# Patient Record
Sex: FEMALE | Race: WHITE | NOT HISPANIC OR LATINO | URBAN - METROPOLITAN AREA
[De-identification: names, ages, dates, MRNs, and addresses within clinical notes are randomized per-mention and may not be internally consistent; named-entity substitution may affect disease eponyms.]

---

## 2024-03-02 ENCOUNTER — APPOINTMENT (OUTPATIENT)
Dept: PEDIATRICS | Facility: CLINIC | Age: 19
End: 2024-03-02
Payer: COMMERCIAL

## 2024-03-06 ENCOUNTER — OFFICE VISIT (OUTPATIENT)
Dept: PEDIATRICS | Facility: CLINIC | Age: 19
End: 2024-03-06
Payer: COMMERCIAL

## 2024-03-06 VITALS
HEIGHT: 66 IN | SYSTOLIC BLOOD PRESSURE: 118 MMHG | DIASTOLIC BLOOD PRESSURE: 76 MMHG | BODY MASS INDEX: 30.67 KG/M2 | WEIGHT: 190.8 LBS | HEART RATE: 72 BPM

## 2024-03-06 DIAGNOSIS — R42 DIZZINESS: ICD-10-CM

## 2024-03-06 DIAGNOSIS — Z00.00 WELLNESS EXAMINATION: Primary | ICD-10-CM

## 2024-03-06 PROBLEM — L70.9 ACNE: Status: ACTIVE | Noted: 2024-03-06

## 2024-03-06 PROBLEM — N92.6 IRREGULAR MENSTRUAL CYCLE: Status: ACTIVE | Noted: 2024-03-06

## 2024-03-06 LAB
NON-UH HIE A/G RATIO: 1.2
NON-UH HIE ALB: 4 G/DL (ref 3.4–5)
NON-UH HIE ALK PHOS: 62 UNIT/L (ref 45–117)
NON-UH HIE BILIRUBIN, TOTAL: 0.4 MG/DL (ref 0.3–1.2)
NON-UH HIE BUN/CREAT RATIO: 20
NON-UH HIE BUN: 16 MG/DL (ref 9–23)
NON-UH HIE CALCIUM: 9.5 MG/DL (ref 8.7–10.4)
NON-UH HIE CALCULATED LDL CHOLESTEROL: 76 MG/DL (ref 60–130)
NON-UH HIE CALCULATED OSMOLALITY: 284 MOSM/KG (ref 275–295)
NON-UH HIE CHLORIDE: 108 MMOL/L (ref 98–107)
NON-UH HIE CHOLESTEROL: 141 MG/DL (ref 100–200)
NON-UH HIE CO2, VENOUS: 26 MMOL/L (ref 20–31)
NON-UH HIE CREATININE: 0.8 MG/DL (ref 0.5–0.8)
NON-UH HIE FERRITIN: 32 NG/ML (ref 10–291)
NON-UH HIE GFR AA: >60
NON-UH HIE GLOBULIN: 3.4 G/DL
NON-UH HIE GLOMERULAR FILTRATION RATE: >60 ML/MIN/1.73M?
NON-UH HIE GLUCOSE: 90 MG/DL (ref 74–106)
NON-UH HIE GOT: 14 UNIT/L (ref 15–37)
NON-UH HIE GPT: 19 UNIT/L (ref 10–49)
NON-UH HIE HCT: 40.3 % (ref 36–46)
NON-UH HIE HDL CHOLESTEROL: 48 MG/DL (ref 40–60)
NON-UH HIE HGB: 13.6 G/DL (ref 12–16)
NON-UH HIE INSTR WBC ND: 5.6
NON-UH HIE IRON: 81 UG/DL (ref 50–170)
NON-UH HIE K: 4.4 MMOL/L (ref 3.5–5.1)
NON-UH HIE MCH: 29.9 PG (ref 27–34)
NON-UH HIE MCHC: 33.8 G/DL (ref 32–37)
NON-UH HIE MCV: 88.6 FL (ref 80–100)
NON-UH HIE MPV: 9.2 FL (ref 7.4–10.4)
NON-UH HIE NA: 142 MMOL/L (ref 135–145)
NON-UH HIE PLATELET: 234 X10 (ref 150–450)
NON-UH HIE RBC: 4.54 X10 (ref 4.2–5.4)
NON-UH HIE RDW: 13.9 % (ref 11.5–14.5)
NON-UH HIE SATURATION: 26.1 % (ref 20–50)
NON-UH HIE TIBC: 310 UG/ML (ref 250–425)
NON-UH HIE TOTAL CHOL/HDL CHOL RATIO: 2.9
NON-UH HIE TOTAL PROTEIN: 7.4 G/DL (ref 5.7–8.2)
NON-UH HIE TRIGLYCERIDES: 84 MG/DL (ref 30–150)
NON-UH HIE TSH: 1.11 UIU/ML (ref 0.55–4.78)
NON-UH HIE WBC: 5.6 X10 (ref 4.5–11)

## 2024-03-06 PROCEDURE — 99395 PREV VISIT EST AGE 18-39: CPT | Performed by: PEDIATRICS

## 2024-03-06 PROCEDURE — 3008F BODY MASS INDEX DOCD: CPT | Performed by: PEDIATRICS

## 2024-03-06 PROCEDURE — 1036F TOBACCO NON-USER: CPT | Performed by: PEDIATRICS

## 2024-03-06 NOTE — PROGRESS NOTES
"Well Child (19 year [facility]/Here by herself)      Concerns:      started carmen time after a drive  to [employer] had vertigo episode?     Then off and on vasovagal         Last one was at lunch table had to put head down  sweaty and  pale  then better    Not great breakfast eater        Never with exercise    No chest pain         Sees eye doctor yearly   Dentist      Off and on over the last few months   has some spells     Sleep: well rested and waking up well in the morning   Diet:  Varied diet   good variety  water drinking   better   Welch:  soft and regular  periods   more regular  finally        Dental:  brushing twice a day and seeing dentist  School:     [employer]  good grades   major   sociology/  [employer]      Activities:   Drugs/Alcohol/Tobacco/Vaping: discussed   Sexuality/Puberty: discussed     walks   Depression/Moods:  mental     Not  dating     Exam:       height is 1.67 m (5' 5.75\") and weight is 86.5 kg (190 lb 12.8 oz). Her blood pressure is 118/76 and her pulse is 72.     General: Well-developed, well-nourished, alert and oriented, no acute distress  Eyes: Normal sclera, BRIGHT, EOMI. Red reflex intact, light reflex symmetric.   ENT: Moist mucous membranes, normal throat, no nasal discharge. TMs are normal.  Cardiac:  Normal S1/S2, regular rhythm. Capillary refill less than 2 seconds. No clinically significant murmurs.    Pulmonary: Clear to auscultation bilaterally, no work of breathing.  GI: Soft nontender nondistended abdomen, no HSM, no masses.    Skin: No specific or unusual rashes  Neuro: Symmetric face, no ataxia, grossly normal strength.  Lymph and Neck: No lymphadenopathy, no visible thyroid swelling.  Orthopedic:  normal range of motion of shoulders and normal duck walk, normal spine/no scoliosis      :   deferred    Assessment and Plan:    Diagnoses and all orders for this visit:  Wellness examination  -     CBC; Future  -     Comprehensive Metabolic Panel; Future  -     TSH with reflex " to Free T4 if abnormal; Future  -     Vitamin D 1,25 Dihydroxy (for eval of hypercalcemia); Future  -     Ferritin; Future  -     Iron and TIBC; Future  -     Lipid panel; Future  Pediatric body mass index (BMI) of 5th percentile to less than 85th percentile for age  Dizziness          We discussed physical activity and nutritional requirements today. Continue to return annually for a checkup and any necessary booster vaccines.   You are responsible for  your own health decisions.  Please make sure you have access to your medical information via the [employer] [facility]    Type B meningitis vaccine has been available since 2015. Type B meningitis now accounts for 30% of all meningitis cases because the original Type ACWY meningitis vaccine has worked so well. On average there are 1-2 college campuses affected per year with some cases.  We recommend this vaccine to prevent meningitis in late high school and college age children.     Tetanus booster (usually Tdap) should be given 10 years after the last one, typically around age 22 yrs.         We are checking some labs today and will call you next Wednesday with results

## 2024-03-09 LAB — NON-UH HIE MISC SENDOUT: NORMAL

## 2024-03-26 ENCOUNTER — APPOINTMENT (OUTPATIENT)
Dept: PEDIATRICS | Facility: CLINIC | Age: 19
End: 2024-03-26
Payer: COMMERCIAL

## 2024-07-18 ENCOUNTER — APPOINTMENT (OUTPATIENT)
Dept: PEDIATRICS | Facility: CLINIC | Age: 19
End: 2024-07-18
Payer: COMMERCIAL

## 2024-07-18 VITALS
HEART RATE: 79 BPM | HEIGHT: 65 IN | DIASTOLIC BLOOD PRESSURE: 76 MMHG | SYSTOLIC BLOOD PRESSURE: 126 MMHG | BODY MASS INDEX: 31.49 KG/M2 | WEIGHT: 189 LBS

## 2024-07-18 DIAGNOSIS — N89.8 VAGINAL IRRITATION: ICD-10-CM

## 2024-07-18 DIAGNOSIS — Z23 ENCOUNTER FOR VACCINATION: Primary | ICD-10-CM

## 2024-07-18 PROBLEM — Z00.00 WELLNESS EXAMINATION: Status: ACTIVE | Noted: 2024-07-18

## 2024-07-18 PROCEDURE — 90471 IMMUNIZATION ADMIN: CPT | Performed by: PEDIATRICS

## 2024-07-18 PROCEDURE — 1036F TOBACCO NON-USER: CPT | Performed by: PEDIATRICS

## 2024-07-18 PROCEDURE — 3008F BODY MASS INDEX DOCD: CPT | Performed by: PEDIATRICS

## 2024-07-18 PROCEDURE — 99213 OFFICE O/P EST LOW 20 MIN: CPT | Performed by: PEDIATRICS

## 2024-07-18 PROCEDURE — 90620 MENB-4C VACCINE IM: CPT | Performed by: PEDIATRICS

## 2024-07-18 NOTE — PROGRESS NOTES
"   Ya Salgado is a 19 y.o. 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 Red reflex intact, light reflex symmetric.   ENT: Moist mucous membranes, normal throat, no nasal discharge. TMs are normal.  Cardiac:  Normal S1/S2, regular rhythm. Capillary refill less than 2 seconds. No clinically significant murmurs.    Pulmonary: Clear to auscultation bilaterally, no work of breathing.  GI: Soft nontender nondistended abdomen, no HSM, no masses.    Skin: No specific or unusual rashes  Neuro: Symmetric face, no ataxia, grossly normal strength and normal reflexes.  Lymph and Neck: No lymphadenopathy, no visible thyroid swelling.  Musculoskeletal:   Full  range of motion, normal strength and tone, no significant scoliosis,  no joint swelling or bone tenderness  Psych:  normal mood and affect  :  not examined       Assessment/Plan   Problem List Items Addressed This Visit    None  Visit Diagnoses       Encounter for vaccination    -  Primary    Relevant Orders    Meningococcal B vaccine (BEXSERO) (Completed)    Vaginal irritation                Patient Instructions      We discussed that we do not diagnose or start medication for ADHD in her age group      Gave list of providers in the area that can help with that   Reassured that she is having school success  and social and " work success  and  she may fave some attention issues  but she is doing a great job of working with this to still attain success.      Recommended OTC monistat and gentle soap like Dove      To GYN if continues

## 2024-07-18 NOTE — PATIENT INSTRUCTIONS
We discussed that we do not diagnose or start medication for ADHD in her age group      Gave list of providers in the area that can help with that   Reassured that she is having school success  and social and work success  and  she may fave some attention issues  but she is doing a great job of working with this to still attain success.      Recommended OTC monistat and gentle soap like Dove      To GYN if continues

## 2025-05-19 ENCOUNTER — OFFICE VISIT (OUTPATIENT)
Dept: PEDIATRICS | Facility: CLINIC | Age: 20
End: 2025-05-19
Payer: COMMERCIAL

## 2025-05-19 VITALS — WEIGHT: 190.4 LBS | TEMPERATURE: 98.3 F | BODY MASS INDEX: 30.6 KG/M2 | HEIGHT: 66 IN

## 2025-05-19 DIAGNOSIS — B34.9 VIRAL SYNDROME: Primary | ICD-10-CM

## 2025-05-19 PROCEDURE — 99213 OFFICE O/P EST LOW 20 MIN: CPT | Performed by: PEDIATRICS

## 2025-05-19 PROCEDURE — 3008F BODY MASS INDEX DOCD: CPT | Performed by: PEDIATRICS

## 2025-05-19 PROCEDURE — 1036F TOBACCO NON-USER: CPT | Performed by: PEDIATRICS

## 2025-05-19 NOTE — PATIENT INSTRUCTIONS
Viral syndrome.  We will plan for symptomatic care with ibuprofen, acetaminophen, fluids, and humidity.  Fevers if present can last 4-5 days total and congestion and coughing will likely last longer, sometimes up to 2 weeks total. Call back for increasing or new fevers, worsening or new symptoms such as ear pain or trouble breathing, or no improvement.     Unlikely strep based on exam and symptoms - not tested.